# Patient Record
Sex: MALE | Race: WHITE | Employment: OTHER | URBAN - METROPOLITAN AREA
[De-identification: names, ages, dates, MRNs, and addresses within clinical notes are randomized per-mention and may not be internally consistent; named-entity substitution may affect disease eponyms.]

---

## 2020-01-01 ENCOUNTER — OFFICE VISIT (OUTPATIENT)
Dept: FAMILY MEDICINE CLINIC | Facility: CLINIC | Age: 65
End: 2020-01-01
Payer: MEDICARE

## 2020-01-01 ENCOUNTER — OFFICE VISIT (OUTPATIENT)
Dept: SLEEP CENTER | Age: 65
End: 2020-01-01
Attending: Other
Payer: MEDICARE

## 2020-01-01 ENCOUNTER — TELEPHONE (OUTPATIENT)
Dept: FAMILY MEDICINE CLINIC | Facility: CLINIC | Age: 65
End: 2020-01-01

## 2020-01-01 ENCOUNTER — OFFICE VISIT (OUTPATIENT)
Dept: SURGERY | Facility: CLINIC | Age: 65
End: 2020-01-01
Payer: MEDICARE

## 2020-01-01 ENCOUNTER — ORDER TRANSCRIPTION (OUTPATIENT)
Dept: SLEEP CENTER | Age: 65
End: 2020-01-01

## 2020-01-01 ENCOUNTER — LAB ENCOUNTER (OUTPATIENT)
Dept: LAB | Age: 65
End: 2020-01-01
Attending: Other
Payer: MEDICARE

## 2020-01-01 VITALS
HEIGHT: 66.73 IN | DIASTOLIC BLOOD PRESSURE: 60 MMHG | SYSTOLIC BLOOD PRESSURE: 124 MMHG | TEMPERATURE: 97 F | OXYGEN SATURATION: 97 % | WEIGHT: 214 LBS | BODY MASS INDEX: 33.99 KG/M2 | RESPIRATION RATE: 16 BRPM | HEART RATE: 59 BPM

## 2020-01-01 VITALS
HEIGHT: 67.72 IN | OXYGEN SATURATION: 97 % | DIASTOLIC BLOOD PRESSURE: 60 MMHG | SYSTOLIC BLOOD PRESSURE: 122 MMHG | BODY MASS INDEX: 32.91 KG/M2 | HEART RATE: 76 BPM | WEIGHT: 214.63 LBS | TEMPERATURE: 98 F

## 2020-01-01 VITALS — TEMPERATURE: 97 F | DIASTOLIC BLOOD PRESSURE: 76 MMHG | SYSTOLIC BLOOD PRESSURE: 125 MMHG | HEART RATE: 65 BPM

## 2020-01-01 DIAGNOSIS — F90.0 ATTENTION DEFICIT HYPERACTIVITY DISORDER (ADHD), PREDOMINANTLY INATTENTIVE TYPE: ICD-10-CM

## 2020-01-01 DIAGNOSIS — J30.89 PERENNIAL ALLERGIC RHINITIS: ICD-10-CM

## 2020-01-01 DIAGNOSIS — G47.00 INSOMNIA, UNSPECIFIED TYPE: ICD-10-CM

## 2020-01-01 DIAGNOSIS — Z11.59 SCREENING FOR VIRAL DISEASE: ICD-10-CM

## 2020-01-01 DIAGNOSIS — I73.9 PAD (PERIPHERAL ARTERY DISEASE) (HCC): ICD-10-CM

## 2020-01-01 DIAGNOSIS — G47.33 OBSTRUCTIVE SLEEP APNEA: Primary | ICD-10-CM

## 2020-01-01 DIAGNOSIS — Z79.899 HIGH RISK MEDICATION USE: ICD-10-CM

## 2020-01-01 DIAGNOSIS — Z01.818 PREOP EXAMINATION: Primary | ICD-10-CM

## 2020-01-01 DIAGNOSIS — G47.33 OBSTRUCTIVE SLEEP APNEA: ICD-10-CM

## 2020-01-01 DIAGNOSIS — R68.89 ABNORMAL ANKLE BRACHIAL INDEX (ABI): ICD-10-CM

## 2020-01-01 DIAGNOSIS — Z23 FLU VACCINE NEED: ICD-10-CM

## 2020-01-01 DIAGNOSIS — R06.83 SNORING: ICD-10-CM

## 2020-01-01 DIAGNOSIS — Z01.818 PREOP EXAMINATION: ICD-10-CM

## 2020-01-01 DIAGNOSIS — F13.20 HYPNOTIC DEPENDENCE WITH CURRENT USE (HCC): ICD-10-CM

## 2020-01-01 DIAGNOSIS — R06.83 SNORING: Primary | ICD-10-CM

## 2020-01-01 DIAGNOSIS — M15.9 PRIMARY OSTEOARTHRITIS INVOLVING MULTIPLE JOINTS: ICD-10-CM

## 2020-01-01 DIAGNOSIS — Z51.81 THERAPEUTIC DRUG MONITORING: Primary | ICD-10-CM

## 2020-01-01 DIAGNOSIS — Z85.46 HISTORY OF PROSTATE CANCER: Primary | ICD-10-CM

## 2020-01-01 PROCEDURE — 99213 OFFICE O/P EST LOW 20 MIN: CPT | Performed by: UROLOGY

## 2020-01-01 PROCEDURE — 3074F SYST BP LT 130 MM HG: CPT | Performed by: FAMILY MEDICINE

## 2020-01-01 PROCEDURE — 3078F DIAST BP <80 MM HG: CPT | Performed by: FAMILY MEDICINE

## 2020-01-01 PROCEDURE — 3078F DIAST BP <80 MM HG: CPT | Performed by: UROLOGY

## 2020-01-01 PROCEDURE — 3008F BODY MASS INDEX DOCD: CPT | Performed by: FAMILY MEDICINE

## 2020-01-01 PROCEDURE — 90662 IIV NO PRSV INCREASED AG IM: CPT | Performed by: FAMILY MEDICINE

## 2020-01-01 PROCEDURE — 99214 OFFICE O/P EST MOD 30 MIN: CPT | Performed by: FAMILY MEDICINE

## 2020-01-01 PROCEDURE — 95810 POLYSOM 6/> YRS 4/> PARAM: CPT

## 2020-01-01 PROCEDURE — 3074F SYST BP LT 130 MM HG: CPT | Performed by: UROLOGY

## 2020-01-01 PROCEDURE — 95811 POLYSOM 6/>YRS CPAP 4/> PARM: CPT

## 2020-01-01 PROCEDURE — G0008 ADMIN INFLUENZA VIRUS VAC: HCPCS | Performed by: FAMILY MEDICINE

## 2020-01-01 RX ORDER — DEXTROAMPHETAMINE SACCHARATE, AMPHETAMINE ASPARTATE MONOHYDRATE, DEXTROAMPHETAMINE SULFATE AND AMPHETAMINE SULFATE 6.25; 6.25; 6.25; 6.25 MG/1; MG/1; MG/1; MG/1
25 CAPSULE, EXTENDED RELEASE ORAL DAILY
Qty: 30 CAPSULE | Refills: 0 | Status: SHIPPED | OUTPATIENT
Start: 2020-01-01 | End: 2021-01-01 | Stop reason: SDUPTHER

## 2020-01-01 RX ORDER — CETIRIZINE HYDROCHLORIDE 5 MG/1
5 TABLET ORAL DAILY
COMMUNITY

## 2020-01-01 RX ORDER — DEXTROAMPHETAMINE SACCHARATE, AMPHETAMINE ASPARTATE MONOHYDRATE, DEXTROAMPHETAMINE SULFATE AND AMPHETAMINE SULFATE 6.25; 6.25; 6.25; 6.25 MG/1; MG/1; MG/1; MG/1
25 CAPSULE, EXTENDED RELEASE ORAL DAILY
Qty: 30 CAPSULE | Refills: 0 | Status: SHIPPED | OUTPATIENT
Start: 2020-01-01 | End: 2020-01-01

## 2020-01-01 RX ORDER — TADALAFIL 5 MG/1
5 TABLET ORAL
Qty: 30 TABLET | Refills: 11 | Status: SHIPPED | OUTPATIENT
Start: 2020-01-01

## 2020-01-01 RX ORDER — ZOLPIDEM TARTRATE 5 MG/1
5 TABLET ORAL NIGHTLY PRN
Qty: 90 TABLET | Refills: 0 | Status: SHIPPED | OUTPATIENT
Start: 2020-01-01 | End: 2021-01-01

## 2020-01-01 RX ORDER — DEXTROAMPHETAMINE SACCHARATE, AMPHETAMINE ASPARTATE MONOHYDRATE, DEXTROAMPHETAMINE SULFATE AND AMPHETAMINE SULFATE 6.25; 6.25; 6.25; 6.25 MG/1; MG/1; MG/1; MG/1
25 CAPSULE, EXTENDED RELEASE ORAL EVERY MORNING
Qty: 30 CAPSULE | Refills: 0 | Status: SHIPPED | OUTPATIENT
Start: 2020-01-01 | End: 2021-01-01 | Stop reason: SDUPTHER

## 2020-01-01 RX ORDER — CROMOLYN SODIUM 5.2 MG
1 AEROSOL, SPRAY WITH PUMP (ML) NASAL 3 TIMES DAILY
Qty: 3 BOTTLE | Refills: 1 | Status: SHIPPED | OUTPATIENT
Start: 2020-01-01 | End: 2021-01-01

## 2020-01-01 RX ORDER — DEXTROAMPHETAMINE SACCHARATE, AMPHETAMINE ASPARTATE, DEXTROAMPHETAMINE SULFATE AND AMPHETAMINE SULFATE 5; 5; 5; 5 MG/1; MG/1; MG/1; MG/1
TABLET ORAL
Qty: 30 TABLET | Refills: 0 | Status: SHIPPED | OUTPATIENT
Start: 2020-01-01 | End: 2021-01-01

## 2020-01-01 RX ORDER — MELOXICAM 15 MG/1
TABLET ORAL
Qty: 90 TABLET | Refills: 0 | Status: SHIPPED | OUTPATIENT
Start: 2020-01-01 | End: 2021-01-01

## 2020-01-01 RX ORDER — CROMOLYN SODIUM 5.2 MG
1 AEROSOL, SPRAY WITH PUMP (ML) NASAL 3 TIMES DAILY
Qty: 26 ML | Refills: 2 | Status: SHIPPED | OUTPATIENT
Start: 2020-01-01 | End: 2020-01-01

## 2020-02-14 ENCOUNTER — OFFICE VISIT (OUTPATIENT)
Dept: FAMILY MEDICINE CLINIC | Facility: CLINIC | Age: 65
End: 2020-02-14
Payer: MEDICARE

## 2020-02-14 VITALS
TEMPERATURE: 98 F | DIASTOLIC BLOOD PRESSURE: 64 MMHG | WEIGHT: 203 LBS | BODY MASS INDEX: 31.12 KG/M2 | OXYGEN SATURATION: 96 % | HEART RATE: 64 BPM | SYSTOLIC BLOOD PRESSURE: 122 MMHG | HEIGHT: 67.72 IN

## 2020-02-14 DIAGNOSIS — N52.36 ERECTILE DYSFUNCTION FOLLOWING INTERSTITIAL SEED THERAPY: Primary | ICD-10-CM

## 2020-02-14 DIAGNOSIS — M19.041 OSTEOARTHRITIS OF BOTH HANDS, UNSPECIFIED OSTEOARTHRITIS TYPE: ICD-10-CM

## 2020-02-14 DIAGNOSIS — Z87.891 HISTORY OF SMOKING 25-50 PACK YEARS: ICD-10-CM

## 2020-02-14 DIAGNOSIS — F90.0 ATTENTION DEFICIT HYPERACTIVITY DISORDER (ADHD), PREDOMINANTLY INATTENTIVE TYPE: ICD-10-CM

## 2020-02-14 DIAGNOSIS — F13.20 HYPNOTIC DEPENDENCE WITH CURRENT USE (HCC): ICD-10-CM

## 2020-02-14 DIAGNOSIS — J30.89 PERENNIAL ALLERGIC RHINITIS: ICD-10-CM

## 2020-02-14 DIAGNOSIS — M19.042 OSTEOARTHRITIS OF BOTH HANDS, UNSPECIFIED OSTEOARTHRITIS TYPE: ICD-10-CM

## 2020-02-14 DIAGNOSIS — Z85.46 PERSONAL HISTORY OF PROSTATE CANCER: ICD-10-CM

## 2020-02-14 DIAGNOSIS — F41.1 GAD (GENERALIZED ANXIETY DISORDER): ICD-10-CM

## 2020-02-14 PROCEDURE — 99204 OFFICE O/P NEW MOD 45 MIN: CPT | Performed by: FAMILY MEDICINE

## 2020-02-14 RX ORDER — MELOXICAM 15 MG/1
15 TABLET ORAL DAILY
COMMUNITY
End: 2020-03-13

## 2020-02-14 RX ORDER — DEXTROAMPHETAMINE SACCHARATE, AMPHETAMINE ASPARTATE, DEXTROAMPHETAMINE SULFATE AND AMPHETAMINE SULFATE 7.5; 7.5; 7.5; 7.5 MG/1; MG/1; MG/1; MG/1
30 TABLET ORAL DAILY
COMMUNITY
Start: 2020-01-29 | End: 2020-03-13 | Stop reason: SDUPTHER

## 2020-02-14 RX ORDER — ESCITALOPRAM OXALATE 10 MG/1
10 TABLET ORAL DAILY
COMMUNITY
Start: 2020-02-11 | End: 2020-03-13

## 2020-02-14 RX ORDER — SILDENAFIL 100 MG/1
100 TABLET, FILM COATED ORAL
COMMUNITY
End: 2020-02-18

## 2020-02-14 RX ORDER — ZOLPIDEM TARTRATE 12.5 MG/1
12.5 TABLET, FILM COATED, EXTENDED RELEASE ORAL DAILY
COMMUNITY
Start: 2020-02-10 | End: 2020-03-13 | Stop reason: DRUGHIGH

## 2020-02-14 RX ORDER — MONTELUKAST SODIUM 10 MG/1
10 TABLET ORAL DAILY
COMMUNITY
Start: 2020-02-09 | End: 2020-03-13

## 2020-02-14 NOTE — PROGRESS NOTES
Alisa Sullivan is a 75year old male. HPI:       New patient. Erectile dysfunction following interstitial seed therapy   Personal history of prostate cancer  Has been using Viagra 100 mg as needed with fair improvement.   Patient would like to se Dysfunction. • Meloxicam 15 MG Oral Tab Take 15 mg by mouth daily. Take one tab daily as needed with food.         Past Medical History:   Diagnosis Date   • Attention deficit hyperactivity disorder (ADHD), predominantly inattentive type 2/14/2020   • E thyromegaly, no masses  LUNGS: CTA A/P no wheezes/ronchi/rales/crackles  CARDIO: RRR, +S1/S2, no mm/S3/S4  VASCULAR:  no edema  GI: normal bowel sounds, NT/ND, no pulsations, no r/r/g, no masses, no HSM  EXTREMITIES: no cyanosis or clubbing  NEURO: Alert a Singulair 10 mg daily with fairly good control of allergy symptoms. 7. Osteoarthritis of both hands, unspecified osteoarthritis type  Patient taking meloxicam 15 mg daily with fairly good relief.     8. GEMA (generalized anxiety disorder)  Patient taking

## 2020-02-18 ENCOUNTER — TELEPHONE (OUTPATIENT)
Dept: FAMILY MEDICINE CLINIC | Facility: CLINIC | Age: 65
End: 2020-02-18

## 2020-02-18 RX ORDER — TADALAFIL 20 MG/1
20 TABLET ORAL
Qty: 8 TABLET | Refills: 2 | Status: SHIPPED | OUTPATIENT
Start: 2020-02-18 | End: 2020-03-13

## 2020-02-18 RX ORDER — SILDENAFIL 100 MG/1
100 TABLET, FILM COATED ORAL
Qty: 8 TABLET | Refills: 5 | Status: SHIPPED | OUTPATIENT
Start: 2020-02-18 | End: 2020-02-18

## 2020-02-27 ENCOUNTER — TELEPHONE (OUTPATIENT)
Dept: FAMILY MEDICINE CLINIC | Facility: CLINIC | Age: 65
End: 2020-02-27

## 2020-02-27 NOTE — TELEPHONE ENCOUNTER
Patient signed HIPAA medical records authorization form for the Facility identified below to disclose patient health information to [address] [place] [address] Group:     Sarah Velasquez M.D.   Urologist in [facility]  Address: [address] [facility] , [place],

## 2020-03-02 ENCOUNTER — OFFICE VISIT (OUTPATIENT)
Dept: SURGERY | Facility: CLINIC | Age: 65
End: 2020-03-02
Payer: MEDICARE

## 2020-03-02 VITALS — HEART RATE: 73 BPM | SYSTOLIC BLOOD PRESSURE: 130 MMHG | DIASTOLIC BLOOD PRESSURE: 72 MMHG

## 2020-03-02 DIAGNOSIS — Z85.46 HISTORY OF PROSTATE CANCER: ICD-10-CM

## 2020-03-02 DIAGNOSIS — R82.90 URINE FINDING: Primary | ICD-10-CM

## 2020-03-02 DIAGNOSIS — N52.9 ERECTILE DYSFUNCTION, UNSPECIFIED ERECTILE DYSFUNCTION TYPE: ICD-10-CM

## 2020-03-02 DIAGNOSIS — R39.198 SLOW URINARY STREAM: ICD-10-CM

## 2020-03-02 LAB
APPEARANCE: CLEAR
MULTISTIX LOT#: ABNORMAL NUMERIC
PH, URINE: 8.5 (ref 4.5–8)
SPECIFIC GRAVITY: 1.02 (ref 1–1.03)
URINE-COLOR: YELLOW
UROBILINOGEN,SEMI-QN: 0.2 MG/DL (ref 0–1.9)

## 2020-03-02 PROCEDURE — 99203 OFFICE O/P NEW LOW 30 MIN: CPT | Performed by: UROLOGY

## 2020-03-02 PROCEDURE — 81003 URINALYSIS AUTO W/O SCOPE: CPT | Performed by: UROLOGY

## 2020-03-02 RX ORDER — TADALAFIL 5 MG/1
5 TABLET ORAL
Qty: 30 TABLET | Refills: 11 | Status: SHIPPED | OUTPATIENT
Start: 2020-03-02 | End: 2021-01-01

## 2020-03-02 NOTE — PROGRESS NOTES
Rooming Clinician:     Marisel Fofana is a 75year old male. Patient presents with:  Erectile Dysfuntion: s/p seed implant 2007.   Looking for help with his erections  Erectile Dysfunction:  Frequency of erections: Abnormal  Quality of erections: 5 (10 is Take one tab by mouth at bedtime. • amphetamine-dextroamphetamine 30 MG Oral Tab Take 30 mg by mouth daily. One tab by mouth daily     • Meloxicam 15 MG Oral Tab Take 15 mg by mouth daily. Take one tab daily as needed with food.          Penicillins grossly normal  EXTREMITIES: no cyanosis, clubbing or edema    Bladder scan residual is 16 cc    LAB:         X-RAY[de-identified]         ASSESSMENT:     Erectile dysfunction  History of prostate cancer  Slow urinary stream    PLAN:     PSA, CMP, a.m. testosterone  Pa

## 2020-03-02 NOTE — PATIENT INSTRUCTIONS
On behalf of myself and care team, I would like to thank you for entrusting us with your care today. It is our goal to provide you and your family with excellent care.   Please note that you may receive a survey in the mail; any feedback you have for this since they help most men. If they don’t help you, your doctor can suggest other kinds of treatment. You and your partner may also want to discuss which options would work best in your relationship.  Treatment may include addressing the cause of health probl drugs, supplements, and herbs. · Ask your doctor about whether you can drink alcohol while taking ED medication.   Possible side effects of oral ED medicines  · Headache  · Facial flushing  · Runny or stuffy nose  · Indigestion  · Distortion of your color

## 2020-03-13 ENCOUNTER — OFFICE VISIT (OUTPATIENT)
Dept: FAMILY MEDICINE CLINIC | Facility: CLINIC | Age: 65
End: 2020-03-13
Payer: MEDICARE

## 2020-03-13 VITALS
SYSTOLIC BLOOD PRESSURE: 115 MMHG | HEART RATE: 60 BPM | WEIGHT: 296 LBS | OXYGEN SATURATION: 98 % | HEIGHT: 67.72 IN | DIASTOLIC BLOOD PRESSURE: 60 MMHG | BODY MASS INDEX: 45.38 KG/M2 | TEMPERATURE: 98 F

## 2020-03-13 DIAGNOSIS — N52.36 ERECTILE DYSFUNCTION FOLLOWING INTERSTITIAL SEED THERAPY: ICD-10-CM

## 2020-03-13 DIAGNOSIS — M15.9 PRIMARY OSTEOARTHRITIS INVOLVING MULTIPLE JOINTS: ICD-10-CM

## 2020-03-13 DIAGNOSIS — Z87.891 HISTORY OF SMOKING 25-50 PACK YEARS: ICD-10-CM

## 2020-03-13 DIAGNOSIS — F13.20 HYPNOTIC DEPENDENCE WITH CURRENT USE (HCC): ICD-10-CM

## 2020-03-13 DIAGNOSIS — M19.041 PRIMARY OSTEOARTHRITIS OF BOTH HANDS: ICD-10-CM

## 2020-03-13 DIAGNOSIS — M19.042 PRIMARY OSTEOARTHRITIS OF BOTH HANDS: ICD-10-CM

## 2020-03-13 DIAGNOSIS — J30.89 PERENNIAL ALLERGIC RHINITIS: ICD-10-CM

## 2020-03-13 DIAGNOSIS — E66.01 MORBID OBESITY WITH BMI OF 45.0-49.9, ADULT (HCC): ICD-10-CM

## 2020-03-13 DIAGNOSIS — Z23 NEED FOR VACCINATION: ICD-10-CM

## 2020-03-13 DIAGNOSIS — I70.0 ATHEROSCLEROSIS OF ABDOMINAL AORTA (HCC): ICD-10-CM

## 2020-03-13 DIAGNOSIS — F90.0 ATTENTION DEFICIT HYPERACTIVITY DISORDER (ADHD), PREDOMINANTLY INATTENTIVE TYPE: ICD-10-CM

## 2020-03-13 DIAGNOSIS — F41.1 GAD (GENERALIZED ANXIETY DISORDER): ICD-10-CM

## 2020-03-13 DIAGNOSIS — Z85.46 PERSONAL HISTORY OF PROSTATE CANCER: ICD-10-CM

## 2020-03-13 DIAGNOSIS — Z00.00 MEDICARE ANNUAL WELLNESS VISIT, INITIAL: Primary | ICD-10-CM

## 2020-03-13 DIAGNOSIS — G47.00 INSOMNIA, UNSPECIFIED TYPE: ICD-10-CM

## 2020-03-13 PROCEDURE — G0402 INITIAL PREVENTIVE EXAM: HCPCS | Performed by: FAMILY MEDICINE

## 2020-03-13 PROCEDURE — 90670 PCV13 VACCINE IM: CPT | Performed by: FAMILY MEDICINE

## 2020-03-13 PROCEDURE — G0009 ADMIN PNEUMOCOCCAL VACCINE: HCPCS | Performed by: FAMILY MEDICINE

## 2020-03-13 PROCEDURE — 96160 PT-FOCUSED HLTH RISK ASSMT: CPT | Performed by: FAMILY MEDICINE

## 2020-03-13 PROCEDURE — 99397 PER PM REEVAL EST PAT 65+ YR: CPT | Performed by: FAMILY MEDICINE

## 2020-03-13 RX ORDER — DEXTROAMPHETAMINE SACCHARATE, AMPHETAMINE ASPARTATE, DEXTROAMPHETAMINE SULFATE AND AMPHETAMINE SULFATE 7.5; 7.5; 7.5; 7.5 MG/1; MG/1; MG/1; MG/1
30 TABLET ORAL DAILY
Qty: 30 TABLET | Refills: 0 | Status: SHIPPED | OUTPATIENT
Start: 2020-05-14 | End: 2020-06-11 | Stop reason: SDUPTHER

## 2020-03-13 RX ORDER — MELOXICAM 15 MG/1
15 TABLET ORAL DAILY PRN
Qty: 90 TABLET | Refills: 1 | Status: SHIPPED | OUTPATIENT
Start: 2020-03-13 | End: 2020-09-04

## 2020-03-13 RX ORDER — DEXTROAMPHETAMINE SACCHARATE, AMPHETAMINE ASPARTATE, DEXTROAMPHETAMINE SULFATE AND AMPHETAMINE SULFATE 7.5; 7.5; 7.5; 7.5 MG/1; MG/1; MG/1; MG/1
30 TABLET ORAL DAILY
Qty: 30 TABLET | Refills: 0 | Status: SHIPPED | OUTPATIENT
Start: 2020-03-13 | End: 2020-04-12 | Stop reason: WASHOUT

## 2020-03-13 RX ORDER — DEXTROAMPHETAMINE SACCHARATE, AMPHETAMINE ASPARTATE, DEXTROAMPHETAMINE SULFATE AND AMPHETAMINE SULFATE 7.5; 7.5; 7.5; 7.5 MG/1; MG/1; MG/1; MG/1
30 TABLET ORAL DAILY
Qty: 30 TABLET | Refills: 0 | Status: SHIPPED | OUTPATIENT
Start: 2020-04-13 | End: 2020-05-13 | Stop reason: WASHOUT

## 2020-03-13 RX ORDER — MONTELUKAST SODIUM 10 MG/1
10 TABLET ORAL DAILY PRN
Qty: 90 TABLET | Refills: 1 | Status: SHIPPED | OUTPATIENT
Start: 2020-03-13 | End: 2020-09-04

## 2020-03-13 RX ORDER — ESCITALOPRAM OXALATE 10 MG/1
10 TABLET ORAL DAILY
Qty: 90 TABLET | Refills: 3 | Status: SHIPPED | OUTPATIENT
Start: 2020-03-13 | End: 2021-01-01

## 2020-03-13 RX ORDER — ZOLPIDEM TARTRATE 6.25 MG/1
6.25 TABLET, FILM COATED, EXTENDED RELEASE ORAL NIGHTLY PRN
Qty: 90 TABLET | Refills: 0 | Status: SHIPPED | OUTPATIENT
Start: 2020-03-13 | End: 2020-06-11 | Stop reason: DRUGHIGH

## 2020-03-13 NOTE — PATIENT INSTRUCTIONS
Recommended Websites for Advanced Directives    [email or web address].no. org/publications/[email or web address]/personal_[email or web address]. pdf  An information packet, including necessary form from the [employer] [address] website. http://www. [place]state. [place]/public/books/adv Goods  Chips

## 2020-03-19 PROBLEM — M15.9 PRIMARY OSTEOARTHRITIS INVOLVING MULTIPLE JOINTS: Status: ACTIVE | Noted: 2020-03-19

## 2020-03-19 PROBLEM — G47.00 INSOMNIA: Status: ACTIVE | Noted: 2020-03-19

## 2020-04-23 ENCOUNTER — MED REC SCAN ONLY (OUTPATIENT)
Dept: FAMILY MEDICINE CLINIC | Facility: CLINIC | Age: 65
End: 2020-04-23

## 2020-05-11 DIAGNOSIS — F41.1 GAD (GENERALIZED ANXIETY DISORDER): ICD-10-CM

## 2020-05-11 RX ORDER — ESCITALOPRAM OXALATE 10 MG/1
10 TABLET ORAL DAILY
Qty: 90 TABLET | Refills: 3 | OUTPATIENT
Start: 2020-05-11

## 2020-05-11 NOTE — TELEPHONE ENCOUNTER
Pt requesting refill of Escitalopram 10 mg     Last Time Medication was Filled:  03/13/2020  Qty#90 with 3 refills    Last Office Visit with Provider: 03/13/2020 Return in about 3 months (around 6/13/2020) for Sleep disorder. Sooner if needed. Sophie Kelly sc

## 2020-06-09 ENCOUNTER — OFFICE VISIT (OUTPATIENT)
Dept: SURGERY | Facility: CLINIC | Age: 65
End: 2020-06-09
Payer: MEDICARE

## 2020-06-09 VITALS — SYSTOLIC BLOOD PRESSURE: 144 MMHG | HEART RATE: 84 BPM | DIASTOLIC BLOOD PRESSURE: 70 MMHG

## 2020-06-09 DIAGNOSIS — R82.90 URINE FINDING: Primary | ICD-10-CM

## 2020-06-09 DIAGNOSIS — R39.198 SLOW URINARY STREAM: ICD-10-CM

## 2020-06-09 DIAGNOSIS — N52.9 ERECTILE DYSFUNCTION, UNSPECIFIED ERECTILE DYSFUNCTION TYPE: ICD-10-CM

## 2020-06-09 DIAGNOSIS — Z85.46 HISTORY OF PROSTATE CANCER: ICD-10-CM

## 2020-06-09 PROCEDURE — 51741 ELECTRO-UROFLOWMETRY FIRST: CPT | Performed by: UROLOGY

## 2020-06-09 PROCEDURE — 81003 URINALYSIS AUTO W/O SCOPE: CPT | Performed by: UROLOGY

## 2020-06-09 PROCEDURE — 99213 OFFICE O/P EST LOW 20 MIN: CPT | Performed by: UROLOGY

## 2020-06-09 NOTE — PROGRESS NOTES
Rooming Clinician:     Seble Fierro is a 75year old male. Patient presents with: Follow - Up  Urinary Symptoms: dribbling improving        HPI:     Patient returns for follow-up visit.   He has a history of prostate cancer treated with brachii therapy 8/12/2020] amphetamine-dextroamphetamine (ADDERALL) 30 MG Oral Tab Take 1 tablet (30 mg total) by mouth daily.  30 tablet 0       Penicillins             HIVES   Past Medical History:   Diagnosis Date   • Attention deficit hyperactivity disorder (ADHD), pre edema    LAB:         X-RAY[de-identified]         ASSESSMENT:     History of prostate cancer  Slow urinary stream/improved  Erectile dysfunction/improved    PLAN:     Continue daily Cialis 5 mg  PSA in a.m. testosterone.   If PSA is within an acceptable range, should b

## 2020-06-11 ENCOUNTER — OFFICE VISIT (OUTPATIENT)
Dept: FAMILY MEDICINE CLINIC | Facility: CLINIC | Age: 65
End: 2020-06-11
Payer: MEDICARE

## 2020-06-11 VITALS
WEIGHT: 213.81 LBS | OXYGEN SATURATION: 98 % | TEMPERATURE: 99 F | HEART RATE: 78 BPM | BODY MASS INDEX: 33 KG/M2 | DIASTOLIC BLOOD PRESSURE: 82 MMHG | SYSTOLIC BLOOD PRESSURE: 128 MMHG

## 2020-06-11 DIAGNOSIS — G47.00 INSOMNIA, UNSPECIFIED TYPE: ICD-10-CM

## 2020-06-11 DIAGNOSIS — F13.20 HYPNOTIC DEPENDENCE WITH CURRENT USE (HCC): ICD-10-CM

## 2020-06-11 DIAGNOSIS — F90.0 ATTENTION DEFICIT HYPERACTIVITY DISORDER (ADHD), PREDOMINANTLY INATTENTIVE TYPE: ICD-10-CM

## 2020-06-11 DIAGNOSIS — S39.012A STRAIN OF LUMBAR REGION, INITIAL ENCOUNTER: Primary | ICD-10-CM

## 2020-06-11 PROCEDURE — 99214 OFFICE O/P EST MOD 30 MIN: CPT | Performed by: FAMILY MEDICINE

## 2020-06-11 RX ORDER — DEXTROAMPHETAMINE SACCHARATE, AMPHETAMINE ASPARTATE, DEXTROAMPHETAMINE SULFATE AND AMPHETAMINE SULFATE 7.5; 7.5; 7.5; 7.5 MG/1; MG/1; MG/1; MG/1
30 TABLET ORAL DAILY
Qty: 30 TABLET | Refills: 0 | Status: SHIPPED | OUTPATIENT
Start: 2020-07-12 | End: 2020-08-11 | Stop reason: WASHOUT

## 2020-06-11 RX ORDER — DEXTROAMPHETAMINE SACCHARATE, AMPHETAMINE ASPARTATE, DEXTROAMPHETAMINE SULFATE AND AMPHETAMINE SULFATE 7.5; 7.5; 7.5; 7.5 MG/1; MG/1; MG/1; MG/1
30 TABLET ORAL DAILY
Qty: 30 TABLET | Refills: 0 | Status: SHIPPED | OUTPATIENT
Start: 2020-08-12 | End: 2020-09-11 | Stop reason: WASHOUT

## 2020-06-11 RX ORDER — ZOLPIDEM TARTRATE 5 MG/1
5 TABLET ORAL NIGHTLY PRN
Qty: 90 TABLET | Refills: 0 | Status: SHIPPED | OUTPATIENT
Start: 2020-06-11 | End: 2020-09-04

## 2020-06-11 RX ORDER — DEXTROAMPHETAMINE SACCHARATE, AMPHETAMINE ASPARTATE, DEXTROAMPHETAMINE SULFATE AND AMPHETAMINE SULFATE 7.5; 7.5; 7.5; 7.5 MG/1; MG/1; MG/1; MG/1
30 TABLET ORAL DAILY
Qty: 30 TABLET | Refills: 0 | Status: CANCELLED | OUTPATIENT
Start: 2020-06-11 | End: 2020-07-11

## 2020-06-11 RX ORDER — DEXTROAMPHETAMINE SACCHARATE, AMPHETAMINE ASPARTATE, DEXTROAMPHETAMINE SULFATE AND AMPHETAMINE SULFATE 7.5; 7.5; 7.5; 7.5 MG/1; MG/1; MG/1; MG/1
30 TABLET ORAL DAILY
Qty: 30 TABLET | Refills: 0 | Status: SHIPPED | OUTPATIENT
Start: 2020-06-11 | End: 2020-07-11 | Stop reason: WASHOUT

## 2020-06-11 RX ORDER — ZOLPIDEM TARTRATE 6.25 MG/1
6.25 TABLET, FILM COATED, EXTENDED RELEASE ORAL NIGHTLY PRN
Qty: 90 TABLET | Refills: 0 | Status: CANCELLED | OUTPATIENT
Start: 2020-06-11

## 2020-06-11 NOTE — PROGRESS NOTES
Wes Matute is a 75year old male. HPI:     Insomnia:  Patient has been taking zolpidem CR 6.25 mg for the last 3 months, previously on zolpidem CR 12.5 mg nightly for 12 months or more.   Patient has been doing well on the decreased dose of the zol Tab Take 1 tablet (10 mg total) by mouth daily as needed. 90 tablet 1   • tadalafil 5 MG Oral Tab Take 1 tablet (5 mg total) by mouth daily as needed for Erectile Dysfunction.  30 tablet 11      Past Medical History:   Diagnosis Date   • Attention deficit h 128/82 (BP Location: Left arm, Patient Position: Sitting, Cuff Size: adult)   Pulse 78   Temp 98.8 °F (37.1 °C) (Oral)   Wt 213 lb 12.8 oz (97 kg)   SpO2 98%   BMI 32.78 kg/m²   GENERAL: NAD, pleasant well-appearing obese  male  SKIN: no visible r mg total) by mouth nightly as needed for Sleep. Dispense: 90 tablet; Refill: 0    4. Attention deficit hyperactivity disorder (ADHD), predominantly inattentive type  Stable. Recommend patient take a half a tab or skip a dose on days when able.     - amphe

## 2020-06-11 NOTE — PATIENT INSTRUCTIONS
-Some days try taking half of the Adderall tablet when able. -If back pain is not improving after 7 to 10 days of doing the back stretches/exercises, please call to let Dr. Robbie Martinez know, he may need physical therapy.

## 2020-06-16 DIAGNOSIS — E55.9 VITAMIN D DEFICIENCY: Primary | ICD-10-CM

## 2020-06-16 RX ORDER — ERGOCALCIFEROL (VITAMIN D2) 1250 MCG
50000 CAPSULE ORAL WEEKLY
Qty: 12 CAPSULE | Refills: 0 | Status: SHIPPED | OUTPATIENT
Start: 2020-06-16 | End: 2021-01-01

## 2020-06-24 ENCOUNTER — OFFICE VISIT (OUTPATIENT)
Dept: FAMILY MEDICINE CLINIC | Facility: CLINIC | Age: 65
End: 2020-06-24
Payer: MEDICARE

## 2020-06-24 VITALS
HEART RATE: 77 BPM | WEIGHT: 219 LBS | BODY MASS INDEX: 33.58 KG/M2 | SYSTOLIC BLOOD PRESSURE: 130 MMHG | OXYGEN SATURATION: 97 % | TEMPERATURE: 99 F | DIASTOLIC BLOOD PRESSURE: 64 MMHG | HEIGHT: 67.72 IN

## 2020-06-24 DIAGNOSIS — E78.5 DYSLIPIDEMIA: Primary | ICD-10-CM

## 2020-06-24 DIAGNOSIS — E55.9 VITAMIN D DEFICIENCY: ICD-10-CM

## 2020-06-24 DIAGNOSIS — E53.8 B12 DEFICIENCY: ICD-10-CM

## 2020-06-24 DIAGNOSIS — D64.9 ANEMIA, UNSPECIFIED TYPE: ICD-10-CM

## 2020-06-24 PROCEDURE — 99214 OFFICE O/P EST MOD 30 MIN: CPT | Performed by: FAMILY MEDICINE

## 2020-06-24 NOTE — PROGRESS NOTES
Harolyn Goodell is a 75year old male. HPI:       Anemia:  Recent iron levels in normal range. Patient states he has been told in the past that he has a type of non-worrisome anemia. Dyslipidemia:  Denies family history of heart attack or stroke. both hands 2/14/2020   • Perennial allergic rhinitis 2/14/2020   • Personal history of prostate cancer 2/14/2020   • Severe obesity (BMI >= 40) ([address] 3/13/2020      History reviewed. No pertinent surgical history.    Social History:    Social History    Tob no pulsations, no r/r/g, no masses, no HSM  EXTREMITIES: no cyanosis or clubbing  NEURO: Alert and Oriented x3, CN II-XII grossly intact, no focal weakness  PSYCH: affect normal        DATA:    Component      Latest Ref Rng & Units 6/11/2020   WHITE BLOOD anemia, possible causes of anemia and treatment. 1. Dyslipidemia  Mixed hyperlipidemia with low HDL. Mediterranean diet recommended and discussed.     2. Vitamin D deficiency  Complete full 3-month course of prescription strength vitamin D as prescribed

## 2020-06-24 NOTE — PATIENT INSTRUCTIONS
-Recommend Mediterranean diet.    -Start taking a B12 supplement 500 mcg daily, or a B complex is fine as well.

## 2020-06-25 ENCOUNTER — TELEPHONE (OUTPATIENT)
Dept: SURGERY | Facility: CLINIC | Age: 65
End: 2020-06-25

## 2020-06-25 ENCOUNTER — MED REC SCAN ONLY (OUTPATIENT)
Dept: FAMILY MEDICINE CLINIC | Facility: CLINIC | Age: 65
End: 2020-06-25

## 2020-06-25 NOTE — TELEPHONE ENCOUNTER
----- Message from Tyler Raza MD sent at 6/18/2020 11:25 AM CDT -----  Your recent PSA is undetectable and testosterone level is normal.  Okay to use Cialis as prescribed and follow-up in the office in 6 to 12 months.   Should have both a EVA and PSA

## 2020-07-23 ENCOUNTER — HOSPITAL ENCOUNTER (OUTPATIENT)
Dept: CT IMAGING | Facility: HOSPITAL | Age: 65
Discharge: HOME | End: 2020-07-23
Attending: FAMILY MEDICINE
Payer: MEDICARE

## 2020-07-23 DIAGNOSIS — Z87.891 HISTORY OF SMOKING 25-50 PACK YEARS: ICD-10-CM

## 2020-07-29 DIAGNOSIS — R91.8 PULMONARY NODULES: ICD-10-CM

## 2020-07-29 DIAGNOSIS — Z87.891 HISTORY OF SMOKING 25-50 PACK YEARS: ICD-10-CM

## 2020-07-29 DIAGNOSIS — R91.8 ABNORMAL CT LUNG SCREENING: ICD-10-CM

## 2020-07-29 DIAGNOSIS — R91.8 LUNG NODULES: Primary | ICD-10-CM

## 2020-07-29 DIAGNOSIS — Z87.891 HISTORY OF SMOKING 25-50 PACK YEARS: Primary | ICD-10-CM

## 2020-08-05 LAB
ABSOLUTE BASOPHILS: 59 CELLS/UL (ref 0–200)
ABSOLUTE EOSINOPHILS: 127 CELLS/UL (ref 15–500)
ABSOLUTE LYMPHOCYTES: 1529 CELLS/UL (ref 850–3900)
ABSOLUTE MONOCYTES: 387 CELLS/UL (ref 200–950)
ABSOLUTE NEUTROPHILS: 2798 CELLS/UL (ref 1500–7800)
BASOPHILS: 1.2 %
EOSINOPHILS: 2.6 %
HEMATOCRIT: 40.5 % (ref 38.5–50)
HEMOGLOBIN: 12.7 G/DL (ref 13.2–17.1)
LYMPHOCYTES: 31.2 %
MCH: 20.5 PG (ref 27–33)
MCHC: 31.4 G/DL (ref 32–36)
MCV: 65.3 FL (ref 80–100)
MONOCYTES: 7.9 %
MPV: 11.1 FL (ref 7.5–12.5)
NEUTROPHILS: 57.1 %
PLATELET COUNT: 245 THOUSAND/UL (ref 140–400)
RDW: 17.3 % (ref 11–15)
RED BLOOD CELL COUNT: 6.2 MILLION/UL (ref 4.2–5.8)
VITAMIN B12: 344 PG/ML (ref 200–1100)
VITAMIN D, 25-OH, TOTAL: 43 NG/ML (ref 30–100)
WHITE BLOOD CELL COUNT: 4.9 THOUSAND/UL (ref 3.8–10.8)

## 2020-08-10 DIAGNOSIS — D64.9 ANEMIA, UNSPECIFIED TYPE: Primary | ICD-10-CM

## 2020-09-04 DIAGNOSIS — J30.89 PERENNIAL ALLERGIC RHINITIS: ICD-10-CM

## 2020-09-04 DIAGNOSIS — G47.00 INSOMNIA, UNSPECIFIED TYPE: ICD-10-CM

## 2020-09-04 DIAGNOSIS — E55.9 VITAMIN D DEFICIENCY: ICD-10-CM

## 2020-09-04 DIAGNOSIS — M15.9 PRIMARY OSTEOARTHRITIS INVOLVING MULTIPLE JOINTS: ICD-10-CM

## 2020-09-04 DIAGNOSIS — F13.20 HYPNOTIC DEPENDENCE WITH CURRENT USE (HCC): ICD-10-CM

## 2020-09-04 RX ORDER — ERGOCALCIFEROL 1.25 MG/1
CAPSULE ORAL
Qty: 12 CAPSULE | Refills: 0 | OUTPATIENT
Start: 2020-09-04

## 2020-09-04 RX ORDER — MONTELUKAST SODIUM 10 MG/1
10 TABLET ORAL DAILY PRN
Qty: 90 TABLET | Refills: 1 | Status: SHIPPED | OUTPATIENT
Start: 2020-09-04 | End: 2021-01-01

## 2020-09-04 RX ORDER — MELOXICAM 15 MG/1
TABLET ORAL
Qty: 90 TABLET | Refills: 0 | Status: SHIPPED | OUTPATIENT
Start: 2020-09-04 | End: 2020-01-01

## 2020-09-04 RX ORDER — ZOLPIDEM TARTRATE 5 MG/1
5 TABLET ORAL NIGHTLY PRN
Qty: 90 TABLET | Refills: 0 | Status: SHIPPED | OUTPATIENT
Start: 2020-09-04 | End: 2020-01-01

## 2020-09-04 NOTE — TELEPHONE ENCOUNTER
Requested Prescriptions     Pending Prescriptions Disp Refills   • ZOLPIDEM 5 MG Oral Tab [Pharmacy Med Name: Zolpidem Tartrate 5 Mg Tab Nort] 90 tablet 0     Sig: Take 1 tablet (5 mg total) by mouth nightly as needed for Sleep.    • MELOXICAM 15 MG Oral Ta

## 2020-09-04 NOTE — TELEPHONE ENCOUNTER
Requested Prescriptions     Pending Prescriptions Disp Refills   • MONTELUKAST SODIUM 10 MG Oral Tab [Pharmacy Med Name: Montelukast Sodium 10 Mg Tab Auro] 90 tablet 0     Sig: Take 1 tablet (10 mg total) by mouth daily as needed.      Refused Prescriptions

## 2020-09-23 PROBLEM — R06.83 SNORING: Status: ACTIVE | Noted: 2020-01-01

## 2020-09-23 PROBLEM — I73.9 PAD (PERIPHERAL ARTERY DISEASE) (HCC): Status: ACTIVE | Noted: 2020-01-01

## 2020-09-23 PROBLEM — R68.89 ABNORMAL ANKLE BRACHIAL INDEX (ABI): Status: ACTIVE | Noted: 2020-01-01

## 2020-09-23 NOTE — PROGRESS NOTES
Jc Deal is a 75year old male. HPI:       Snoring:  Falls asleep okay. Difficulty staying asleep. Feels he does not sleep \"that long\". Frequently feels \"tired\". Patient had abnormal EVAN at outside facility.       Attention deficit hype • Attention deficit hyperactivity disorder (ADHD), predominantly inattentive type 2/14/2020   • Erectile dysfunction following interstitial seed therapy 2/14/2020   • GEMA (generalized anxiety disorder) 2/14/2020   • History of smoking 25-50 pack years 2/ (97.3 kg)   SpO2 97%   BMI 32.90 kg/m²   GENERAL: NAD, pleasant obese  male  SKIN: no visible rashes  HEAD: NCAT  NECK: supple, no adenopathy, no thyromegaly, no masses  LUNGS: CTA A/P no wheezes/ronchi/rales/crackles  CARDIO: RRR, +S1/S2, no mm/S Previously on 30 mg. Follow-up in 4 weeks. - Amphetamine-Dextroamphet ER (ADDERALL XR) 25 MG Oral Capsule SR 24 Hr; Take 1 capsule (25 mg total) by mouth every morning. Dispense: 30 capsule; Refill: 0    6.  Perennial allergic rhinitis  Symptoms not co

## 2020-10-21 NOTE — PROGRESS NOTES
Rita Acuna is a 75year old male. HPI:       ADHD:  Predominantly inattentive type. Doing well with restart of Adderall XR 25 mg every morning, patient restarted this since he went back to work.   Patient requesting a splittable tablet to take on MELOXICAM 15 MG Oral Tab Take 1 tablet (15 mg total) by mouth daily as needed for Pain with food  90 tablet 0   • escitalopram 10 MG Oral Tab Take 1 tablet (10 mg total) by mouth daily.  One tab by mouth daily 90 tablet 3   • tadalafil 5 MG Oral Tab Take 1 05/21/2013      FLU VAC High Dose 65 YRS & Older PRSV Free (72057)                          12/01/2019 09/23/2020      Pneumococcal (Prevnar 13)                          03/13/2020      EXAM:   /60   Pulse 59   Temp 97.1 °F (36.2 °C) (Tempor medication holiday when able. Trial of regular release Adderall tablet to take 1/2-1 tab daily as needed on days when he is not working. Follow-up in 3 months. - Amphetamine-Dextroamphet ER (ADDERALL XR) 25 MG Oral Capsule SR 24 Hr;  Take 1 capsule (25

## 2020-10-22 NOTE — TELEPHONE ENCOUNTER
Called Dilia back and relayed message.  They will call Dr. Villaseñor Round office Left ventricular dysfunction has not progressively gotten worse will continue with current medication plan to have a repeat follow-up with Dr. Valles in 6-month

## 2020-10-22 NOTE — TELEPHONE ENCOUNTER
Jessica Allen from [facility] is calling regarding a PT/INR that was ordered for Kianna Ax he is there wanting to have it done but there is no order in the system Jessica Allen is just wondering if Kianna Moran should have it per Dr Mikki Mena, please call Jessica Allen at [phone]

## 2020-10-22 NOTE — TELEPHONE ENCOUNTER
We do not have an indication to have patient check PT/INR. However, recommend patient call Dr. Roshan Parker office, pulmonologist, Dr. Kori Ryder may need for him to have this checked before a procedure he plans on doing.

## 2020-10-25 PROBLEM — Z79.899 HIGH RISK MEDICATION USE: Status: ACTIVE | Noted: 2020-01-01

## 2020-10-25 PROBLEM — Z51.81 THERAPEUTIC DRUG MONITORING: Status: ACTIVE | Noted: 2020-01-01

## 2020-11-03 NOTE — PROCEDURES
[address] [address],[facility]       Accredited by the [facility] ([facility]ASM)    PATIENT'S NAME:        Anjum MELENDEZ  ATTENDING PHYSICIAN:   Cory Hill DO  REFERRING PHYSICIAN:   GISELLE Barron lateral positions. REM supine sleep was not recorded. RESPIRATORY MONITORING:  Moderate to loud snoring was recorded. There was a total of 74 respiratory events, 71 were obstructive hypopneas, 3 obstructive apneas.   Overall Apnea-hypopnea index wa

## 2020-11-16 NOTE — TELEPHONE ENCOUNTER
----- Message from Cheyanne Fonseca DO sent at 11/15/2020  1:45 PM CST -----  Please call patient and verify that the sleep specialist's office has reached out to him to schedule appointment for follow-up.

## 2020-11-16 NOTE — TELEPHONE ENCOUNTER
LMOM to return call to the office. Provided pt office phone [phone] along with office hours.     Detailed message left asking patient if sleep specialist has contacted him

## 2020-11-20 NOTE — TELEPHONE ENCOUNTER
3rd attempt to reach patient. Left detailed message for him with Dr. Jake Lopez message.  Advised to call us back if he has not made appointment with sleep specialist.

## 2020-11-30 NOTE — TELEPHONE ENCOUNTER
Pt requesting refill of ZOLPIDEM 5 MG Oral Tab    Last Time Medication was Filled:  9/4/20    Last Office Visit with Provider: 10/21/20    Appt scheduled on 1/21/21

## 2020-12-01 NOTE — TELEPHONE ENCOUNTER
MELOXICAM 15 MG TABLET 09/04/2020 09/04/2020  90 tablet  90     LOV 10/21/20  Has future appointment 1/21/21

## 2020-12-29 NOTE — PROCEDURES
[address] [address],[facility]       Accredited by the [facility] ([facility]ASM)    PATIENT'S NAME:        EMELINA Encarnacion  ATTENDING PHYSICIAN:   Yeny Stone DO  REFERRING PHYSICIAN:   Ashlyn Lindsay DO  PATIENT AC in the supine and lateral positions. REM supine sleep was recorded. The patient was started on CPAP with the use of an XL Respironics Wisp interface. Initial CPAP pressure was 6 and final CPAP pressure was 11.   At a final setting of 11, the apnea-hypopn

## 2020-12-29 NOTE — PROGRESS NOTES
Rooming Clinician:     Chester Barrow is a 77year old male. Patient presents with: Follow - Up        HPI:     Patient returns for follow-up examination.   He has a history of prostate cancer treated with brachytherapy in 2011 and seems to be doing well (ADDERALL XR) 25 MG Oral Capsule SR 24 Hr Take 1 capsule (25 mg total) by mouth daily.  30 capsule 0       Penicillins             HIVES   Past Medical History:   Diagnosis Date   • Attention deficit hyperactivity disorder (ADHD), predominantly inattentive tenderness  NEURO: grossly normal  EXTREMITIES: no cyanosis, clubbing or edema    LAB:     Lab Results   Component Value Date    WBC 4.9 08/04/2020    HGB 12.7 (L) 08/04/2020    HCT 40.5 08/04/2020     08/04/2020    CREATSERUM 0.85 06/11/2020    BUN

## 2021-01-01 ENCOUNTER — TELEPHONE (OUTPATIENT)
Dept: SURGERY | Facility: CLINIC | Age: 66
End: 2021-01-01

## 2021-01-01 ENCOUNTER — OFFICE VISIT (OUTPATIENT)
Dept: FAMILY MEDICINE CLINIC | Facility: CLINIC | Age: 66
End: 2021-01-01
Payer: MEDICARE

## 2021-01-01 ENCOUNTER — OFFICE VISIT (OUTPATIENT)
Dept: SURGERY | Facility: CLINIC | Age: 66
End: 2021-01-01
Payer: MEDICARE

## 2021-01-01 ENCOUNTER — HOSPITAL ENCOUNTER (OUTPATIENT)
Dept: CT IMAGING | Facility: HOSPITAL | Age: 66
Discharge: HOME | End: 2021-01-01
Attending: FAMILY MEDICINE
Payer: MEDICARE

## 2021-01-01 VITALS
TEMPERATURE: 98 F | SYSTOLIC BLOOD PRESSURE: 120 MMHG | WEIGHT: 208 LBS | HEIGHT: 66 IN | DIASTOLIC BLOOD PRESSURE: 60 MMHG | OXYGEN SATURATION: 98 % | HEART RATE: 72 BPM | BODY MASS INDEX: 33.43 KG/M2

## 2021-01-01 VITALS — HEART RATE: 64 BPM | SYSTOLIC BLOOD PRESSURE: 133 MMHG | TEMPERATURE: 97 F | DIASTOLIC BLOOD PRESSURE: 69 MMHG

## 2021-01-01 VITALS
TEMPERATURE: 97 F | DIASTOLIC BLOOD PRESSURE: 74 MMHG | OXYGEN SATURATION: 97 % | BODY MASS INDEX: 33.85 KG/M2 | HEART RATE: 69 BPM | SYSTOLIC BLOOD PRESSURE: 136 MMHG | HEIGHT: 66 IN | WEIGHT: 210.63 LBS

## 2021-01-01 DIAGNOSIS — R91.8 ABNORMAL CT LUNG SCREENING: ICD-10-CM

## 2021-01-01 DIAGNOSIS — I73.9 PAD (PERIPHERAL ARTERY DISEASE) (HCC): ICD-10-CM

## 2021-01-01 DIAGNOSIS — J30.89 PERENNIAL ALLERGIC RHINITIS: ICD-10-CM

## 2021-01-01 DIAGNOSIS — Z85.46 HISTORY OF PROSTATE CANCER: ICD-10-CM

## 2021-01-01 DIAGNOSIS — Z85.46 PERSONAL HISTORY OF PROSTATE CANCER: ICD-10-CM

## 2021-01-01 DIAGNOSIS — Z79.899 HIGH RISK MEDICATION USE: ICD-10-CM

## 2021-01-01 DIAGNOSIS — G47.00 INSOMNIA, UNSPECIFIED TYPE: ICD-10-CM

## 2021-01-01 DIAGNOSIS — M15.9 PRIMARY OSTEOARTHRITIS INVOLVING MULTIPLE JOINTS: ICD-10-CM

## 2021-01-01 DIAGNOSIS — M19.041 PRIMARY OSTEOARTHRITIS OF BOTH HANDS: ICD-10-CM

## 2021-01-01 DIAGNOSIS — R91.8 PULMONARY NODULES: ICD-10-CM

## 2021-01-01 DIAGNOSIS — F41.1 GAD (GENERALIZED ANXIETY DISORDER): ICD-10-CM

## 2021-01-01 DIAGNOSIS — E55.9 VITAMIN D DEFICIENCY: ICD-10-CM

## 2021-01-01 DIAGNOSIS — N52.36 ERECTILE DYSFUNCTION FOLLOWING INTERSTITIAL SEED THERAPY: ICD-10-CM

## 2021-01-01 DIAGNOSIS — F13.20 HYPNOTIC DEPENDENCE WITH CURRENT USE (HCC): ICD-10-CM

## 2021-01-01 DIAGNOSIS — Z87.891 HISTORY OF SMOKING 25-50 PACK YEARS: ICD-10-CM

## 2021-01-01 DIAGNOSIS — F90.0 ATTENTION DEFICIT HYPERACTIVITY DISORDER (ADHD), PREDOMINANTLY INATTENTIVE TYPE: ICD-10-CM

## 2021-01-01 DIAGNOSIS — Z00.00 MEDICARE ANNUAL WELLNESS VISIT, SUBSEQUENT: Primary | ICD-10-CM

## 2021-01-01 DIAGNOSIS — G47.33 OSA ON CPAP: ICD-10-CM

## 2021-01-01 DIAGNOSIS — D64.9 ANEMIA, UNSPECIFIED TYPE: ICD-10-CM

## 2021-01-01 DIAGNOSIS — R68.89 ABNORMAL ANKLE BRACHIAL INDEX (ABI): ICD-10-CM

## 2021-01-01 DIAGNOSIS — Z23 NEED FOR VACCINATION: ICD-10-CM

## 2021-01-01 DIAGNOSIS — Z99.89 OSA ON CPAP: ICD-10-CM

## 2021-01-01 DIAGNOSIS — E66.09 CLASS 1 OBESITY DUE TO EXCESS CALORIES WITH SERIOUS COMORBIDITY AND BODY MASS INDEX (BMI) OF 33.0 TO 33.9 IN ADULT: ICD-10-CM

## 2021-01-01 DIAGNOSIS — Z51.81 THERAPEUTIC DRUG MONITORING: Primary | ICD-10-CM

## 2021-01-01 DIAGNOSIS — E78.5 DYSLIPIDEMIA: ICD-10-CM

## 2021-01-01 DIAGNOSIS — I70.0 ATHEROSCLEROSIS OF ABDOMINAL AORTA (HCC): ICD-10-CM

## 2021-01-01 DIAGNOSIS — E53.8 B12 DEFICIENCY: ICD-10-CM

## 2021-01-01 DIAGNOSIS — R82.90 URINE FINDING: Primary | ICD-10-CM

## 2021-01-01 DIAGNOSIS — M19.042 PRIMARY OSTEOARTHRITIS OF BOTH HANDS: ICD-10-CM

## 2021-01-01 DIAGNOSIS — J44.9 CHRONIC OBSTRUCTIVE PULMONARY DISEASE, UNSPECIFIED COPD TYPE (HCC): ICD-10-CM

## 2021-01-01 DIAGNOSIS — F39 MOOD DISORDER (HCC): ICD-10-CM

## 2021-01-01 DIAGNOSIS — G47.33 OSA (OBSTRUCTIVE SLEEP APNEA): ICD-10-CM

## 2021-01-01 PROCEDURE — 3074F SYST BP LT 130 MM HG: CPT | Performed by: FAMILY MEDICINE

## 2021-01-01 PROCEDURE — 3075F SYST BP GE 130 - 139MM HG: CPT | Performed by: FAMILY MEDICINE

## 2021-01-01 PROCEDURE — 75635 CT ANGIO ABDOMINAL ARTERIES: CPT | Performed by: FAMILY MEDICINE

## 2021-01-01 PROCEDURE — 3078F DIAST BP <80 MM HG: CPT | Performed by: FAMILY MEDICINE

## 2021-01-01 PROCEDURE — 99213 OFFICE O/P EST LOW 20 MIN: CPT | Performed by: UROLOGY

## 2021-01-01 PROCEDURE — 3008F BODY MASS INDEX DOCD: CPT | Performed by: FAMILY MEDICINE

## 2021-01-01 PROCEDURE — 99397 PER PM REEVAL EST PAT 65+ YR: CPT | Performed by: FAMILY MEDICINE

## 2021-01-01 PROCEDURE — 3075F SYST BP GE 130 - 139MM HG: CPT | Performed by: UROLOGY

## 2021-01-01 PROCEDURE — 99214 OFFICE O/P EST MOD 30 MIN: CPT | Performed by: FAMILY MEDICINE

## 2021-01-01 PROCEDURE — 3078F DIAST BP <80 MM HG: CPT | Performed by: UROLOGY

## 2021-01-01 PROCEDURE — G0438 PPPS, INITIAL VISIT: HCPCS | Performed by: FAMILY MEDICINE

## 2021-01-01 PROCEDURE — 82565 ASSAY OF CREATININE: CPT

## 2021-01-01 PROCEDURE — 81003 URINALYSIS AUTO W/O SCOPE: CPT | Performed by: UROLOGY

## 2021-01-01 PROCEDURE — 96160 PT-FOCUSED HLTH RISK ASSMT: CPT | Performed by: FAMILY MEDICINE

## 2021-01-01 RX ORDER — MELOXICAM 15 MG/1
15 TABLET ORAL DAILY PRN
Qty: 90 TABLET | Refills: 1 | Status: SHIPPED | OUTPATIENT
Start: 2021-01-01

## 2021-01-01 RX ORDER — MONTELUKAST SODIUM 10 MG/1
10 TABLET ORAL DAILY PRN
Qty: 90 TABLET | Refills: 0 | Status: SHIPPED | OUTPATIENT
Start: 2021-01-01 | End: 2021-01-01

## 2021-01-01 RX ORDER — DEXTROAMPHETAMINE SACCHARATE, AMPHETAMINE ASPARTATE MONOHYDRATE, DEXTROAMPHETAMINE SULFATE AND AMPHETAMINE SULFATE 6.25; 6.25; 6.25; 6.25 MG/1; MG/1; MG/1; MG/1
25 CAPSULE, EXTENDED RELEASE ORAL DAILY
Qty: 30 CAPSULE | Refills: 0 | Status: SHIPPED | OUTPATIENT
Start: 2021-01-01 | End: 2021-01-01

## 2021-01-01 RX ORDER — DEXTROAMPHETAMINE SACCHARATE, AMPHETAMINE ASPARTATE MONOHYDRATE, DEXTROAMPHETAMINE SULFATE AND AMPHETAMINE SULFATE 6.25; 6.25; 6.25; 6.25 MG/1; MG/1; MG/1; MG/1
25 CAPSULE, EXTENDED RELEASE ORAL DAILY
Qty: 30 CAPSULE | Refills: 0 | Status: SHIPPED | OUTPATIENT
Start: 2021-01-01 | End: 2021-01-01 | Stop reason: SDUPTHER

## 2021-01-01 RX ORDER — DEXTROAMPHETAMINE SACCHARATE, AMPHETAMINE ASPARTATE, DEXTROAMPHETAMINE SULFATE AND AMPHETAMINE SULFATE 5; 5; 5; 5 MG/1; MG/1; MG/1; MG/1
TABLET ORAL
Qty: 30 TABLET | Refills: 0 | Status: SHIPPED | OUTPATIENT
Start: 2021-01-01

## 2021-01-01 RX ORDER — ESCITALOPRAM OXALATE 10 MG/1
10 TABLET ORAL DAILY
Qty: 90 TABLET | Refills: 3 | Status: SHIPPED | OUTPATIENT
Start: 2021-01-01

## 2021-01-01 RX ORDER — FLUTICASONE PROPIONATE 50 MCG
2 SPRAY, SUSPENSION (ML) NASAL DAILY
Qty: 3 BOTTLE | Refills: 1 | Status: SHIPPED | OUTPATIENT
Start: 2021-01-01

## 2021-01-01 RX ORDER — DEXTROAMPHETAMINE SACCHARATE, AMPHETAMINE ASPARTATE, DEXTROAMPHETAMINE SULFATE AND AMPHETAMINE SULFATE 5; 5; 5; 5 MG/1; MG/1; MG/1; MG/1
TABLET ORAL
Qty: 30 TABLET | Refills: 0 | Status: SHIPPED | OUTPATIENT
Start: 2021-01-01 | End: 2021-01-01

## 2021-01-01 RX ORDER — ZOLPIDEM TARTRATE 5 MG/1
TABLET ORAL
Qty: 90 TABLET | Refills: 0 | Status: SHIPPED | OUTPATIENT
Start: 2021-01-01 | End: 2021-01-01

## 2021-01-01 RX ORDER — DEXTROAMPHETAMINE SACCHARATE, AMPHETAMINE ASPARTATE, DEXTROAMPHETAMINE SULFATE AND AMPHETAMINE SULFATE 5; 5; 5; 5 MG/1; MG/1; MG/1; MG/1
TABLET ORAL
Qty: 30 TABLET | Refills: 0 | Status: SHIPPED | OUTPATIENT
Start: 2021-01-01 | End: 2021-01-01 | Stop reason: SDUPTHER

## 2021-01-01 RX ORDER — MELOXICAM 15 MG/1
TABLET ORAL
Qty: 90 TABLET | Refills: 0 | Status: SHIPPED | OUTPATIENT
Start: 2021-01-01 | End: 2021-01-01

## 2021-01-01 RX ORDER — ZOLPIDEM TARTRATE 5 MG/1
5 TABLET ORAL NIGHTLY PRN
Qty: 90 TABLET | Refills: 1 | Status: SHIPPED | OUTPATIENT
Start: 2021-01-01

## 2021-01-01 RX ORDER — MONTELUKAST SODIUM 10 MG/1
10 TABLET ORAL DAILY PRN
Qty: 90 TABLET | Refills: 0 | Status: SHIPPED | OUTPATIENT
Start: 2021-01-01

## 2021-01-01 RX ORDER — DEXTROAMPHETAMINE SACCHARATE, AMPHETAMINE ASPARTATE MONOHYDRATE, DEXTROAMPHETAMINE SULFATE AND AMPHETAMINE SULFATE 6.25; 6.25; 6.25; 6.25 MG/1; MG/1; MG/1; MG/1
CAPSULE, EXTENDED RELEASE ORAL
Qty: 30 CAPSULE | Refills: 0 | Status: SHIPPED | OUTPATIENT
Start: 2021-01-01

## 2021-01-01 RX ORDER — CROMOLYN SODIUM 5.2 MG
1 AEROSOL, SPRAY WITH PUMP (ML) NASAL 3 TIMES DAILY
Qty: 3 BOTTLE | Refills: 1 | Status: SHIPPED | OUTPATIENT
Start: 2021-01-01

## 2021-01-06 NOTE — PROGRESS NOTES
HPI:   Beti Jovel is a 75year old male who presents for a MA (Medicare Advantage) [facility] (Once per calendar year).              Fall/Risk Assessment   He has been screened for Falls and is low risk: Fall/Risk Scorin    Cognitive Assessment history of prostate cancer     History of smoking 25-50 pack years     Attention deficit hyperactivity disorder (ADHD), predominantly inattentive type     Perennial allergic rhinitis     Osteoarthritis of both hands     GEMA (generalized anxiety disorder) seed therapy (2/14/2020), GEMA (generalized anxiety disorder) (2/14/2020), History of smoking 25-50 pack years (2/14/2020), Osteoarthritis of both hands (2/14/2020), Perennial allergic rhinitis (2/14/2020), Personal history of prostate cancer (2/14/2020), a Eyes Visual Acuity: Corrected Both Eyes Chart Acuity: 20/25   Able To Tolerate Visual Acuity: Yes      General Appearance:  Alert, cooperative, no distress, appears stated age   Head:  Normocephalic, without obvious abnormality, atraumatic   Eyes:  PERRL, type  Attention deficit hyperactivity disorder (adhd), predominantly inattentive type  William (generalized anxiety disorder)  Primary osteoarthritis involving multiple joints  Primary osteoarthritis of both hands  Personal history of prostate cancer  Erectile medication on days when he does not feel he needs it or to try only taking a half a dose. - amphetamine-dextroamphetamine (ADDERALL) 30 MG Oral Tab; Take 1 tablet (30 mg total) by mouth daily. Dispense: 30 tablet;  Refill: 0  - amphetamine-dextroampheta [927][Q]      Prevnar (Pneumococcal 13) (Same dose all ages)      Meds & Refills for this Visit:  Requested Prescriptions     Signed Prescriptions Disp Refills   • escitalopram 10 MG Oral Tab 90 tablet 3     Sig: Take 1 tablet (10 mg total) by mouth daily. SERVICES  INDICATIONS AND SCHEDULE Internal Lab or Procedure External Lab or Procedure   Diabetes Screening      HbgA1C   Annually No results found for: A1C    No flowsheet data found.     Fasting Blood Sugar (FSB)Annually No results found for: GLUCOSE, GLU Medically needed    Zoster   Not covered by Medicare Part B No vaccine history found This may be covered with your pharmacy  prescription benefits Clear

## 2021-01-21 NOTE — PROGRESS NOTES
Alli Menendez is a 77year old male. HPI:       ADHD:  Primarily inattentive type. Patient still finds the Adderall beneficial for concentration and staying on task for work. Taking extended release every day when he works.   Taking full tab of the Hr Take 1 capsule (25 mg total) by mouth daily. 30 capsule 0   • Cetirizine HCl 5 MG Oral Tab Take 5 mg by mouth daily. • MONTELUKAST SODIUM 10 MG Oral Tab Take 1 tablet (10 mg total) by mouth daily as needed.   90 tablet 1   • ergocalciferol 1.25 MG (5 abdominal pain/nausea/vomiting/blood in stool/black stool/bloating/constipation/diarrhea  NEURO: denies headaches/dizziness/fainting/weakness/change in vision  Psych: No complaints of depression or anxiety      Immunization History  Administered accordance with AASM recommendations, hypopnea events are scored based on an oxygen saturation more than or equal to 4 percent. Body position is documented via technician notes every 15 minutes.  There is an additional channel for measurement of CPAP flow f effect on degree of sleep-disordered breathing identified.   5.       If daytime sleepiness is an issue, the patient needs to understand the potential dangers associated with reduced daytime vigilance.     Thank you for your confidence in the [address] Visit:  Requested Prescriptions     Signed Prescriptions Disp Refills   • amphetamine-dextroamphetamine (ADDERALL) 20 MG Oral Tab 30 tablet 0     Si/2-1 tab p.o. daily as needed.    • Amphetamine-Dextroamphet ER (ADDERALL XR) 25 MG Oral Capsule SR 24 Hr

## 2021-02-23 NOTE — TELEPHONE ENCOUNTER
ZOLPIDEM TARTRATE 11/30/2020 11/30/2020 5 90 tablet     MELOXICAM 15 MG TABLET 12/02/2020 12/02/2020  90 tablet  90     MONTELUKAST SOD 10 MG TABLET 11/27/2020 09/04/2020  90 tablet  90      LOV 1/21/21.    Return in about 8 weeks (around 3/15/2021) for Sup

## 2021-03-26 PROBLEM — Z51.81 THERAPEUTIC DRUG MONITORING: Status: RESOLVED | Noted: 2020-01-01 | Resolved: 2021-01-01

## 2021-03-26 PROBLEM — J44.9 CHRONIC OBSTRUCTIVE PULMONARY DISEASE, UNSPECIFIED COPD TYPE (HCC): Status: ACTIVE | Noted: 2021-01-01

## 2021-03-26 PROBLEM — G47.33 OSA ON CPAP: Status: ACTIVE | Noted: 2021-01-01

## 2021-03-26 PROBLEM — E66.01 MORBID OBESITY WITH BMI OF 45.0-49.9, ADULT (HCC): Status: RESOLVED | Noted: 2020-03-13 | Resolved: 2021-01-01

## 2021-03-26 PROBLEM — S39.012A STRAIN OF LUMBAR REGION: Status: RESOLVED | Noted: 2020-06-11 | Resolved: 2021-01-01

## 2021-03-26 PROBLEM — E66.09 CLASS 1 OBESITY DUE TO EXCESS CALORIES WITH SERIOUS COMORBIDITY AND BODY MASS INDEX (BMI) OF 33.0 TO 33.9 IN ADULT: Status: ACTIVE | Noted: 2021-01-01

## 2021-03-26 PROBLEM — R06.83 SNORING: Status: RESOLVED | Noted: 2020-01-01 | Resolved: 2021-01-01

## 2021-03-26 PROBLEM — R91.8 PULMONARY NODULES: Status: ACTIVE | Noted: 2021-01-01

## 2021-03-26 PROBLEM — R91.8 ABNORMAL CT LUNG SCREENING: Status: ACTIVE | Noted: 2021-01-01

## 2021-03-26 PROBLEM — Z99.89 OSA ON CPAP: Status: ACTIVE | Noted: 2021-01-01

## 2021-03-26 NOTE — PATIENT INSTRUCTIONS
Flex Garzon's SCREENING SCHEDULE   Tests on this list are recommended by your physician but may not be covered, or covered at this frequency, by your insurer. Please check with your insurance carrier before scheduling to verify coverage.     JUAN CARLOS often if abnormal Colonoscopy due on 07/05/2027 Update [facility] if applicable    Flex Sigmoidoscopy Screen  Covered every 5 years No results found for this or any previous visit. No flowsheet data found.      Fecal Occult Blood   Covered Annually your prescription benefits, but Medicare does not cover unless Medically needed    Zoster (Not covered by Medicare Part B) No orders found for this or any previous visit.  This may be covered with your pharmacy  prescription benefits     Recommended Website

## 2021-03-26 NOTE — PROGRESS NOTES
HPI:   Alice Arredondo is a 77year old male who presents for a MA (Medicare Advantage) [facility] (Once per calendar year).            Fall/Risk Assessment   He has been screened for Falls and is low risk: Fall/Risk Scorin    Cognitive Assessment   H pack years     Attention deficit hyperactivity disorder (ADHD), predominantly inattentive type     Perennial allergic rhinitis     Osteoarthritis of both hands     GEMA (generalized anxiety disorder)     Hypnotic dependence with current use ([address]     Athero needed. zolpidem 5 MG Oral Tab, Take 1 tablet (5 mg total) by mouth nightly as needed for Sleep. Meloxicam 15 MG Oral Tab, Take 1 tablet (15 mg total) by mouth daily as needed for Pain.   Amphetamine-Dextroamphet ER (ADDERALL XR) 25 MG Oral Capsule SR 24 years of use. He has never used smokeless tobacco. He reports current alcohol use of about 2.0 standard drinks of alcohol per week. He reports that he does not use drugs.      REVIEW OF SYSTEMS:   GENERAL: feels well otherwise  SKIN: denies any unusual skin Skin:  Skin is warm and dry. No visible rashes. Lymph nodes:  No cervical or supraclavicular adenopathy   Neurologic:  Alert and oriented x3.             Vaccination History     Immunization History   Administered Date(s) Administered   • DTAP INFANRIX or will shortly . Follow-up after completing testing including labs. - CTA ABD PEL SIERRA LEG RUNOFF DIAPH TO TOES IV CONTRAST(CPT=75635);  Future    - COMP METABOLIC PANEL (14)  - LIPID PANEL  - ASSAY, THYROID STIM HORMONE  - FREE T4 (FREE THYROXINE patient. Okay to also continue Nasalcrom. Continue montelukast.    - Cromolyn Sodium (NASALCROM) 5.2 MG/ACT Nasal Aero Soln; 1 spray by Nasal route 3 (three) times daily. Dispense: 3 Bottle; Refill: 1  - Montelukast Sodium 10 MG Oral Tab;  Take 1 tablet urologist.    24. B12 deficiency  Recheck level. - VITAMIN B12    25. Vitamin D deficiency  Recheck level.   - VITAMIN D, 25-HYDROXY        Orders Placed This Encounter      CBC [6399] [Q]      COMP METABOLIC PANEL [24380] [Q]      LIPID PANEL [4820] [Q] as needed       Imaging & Consults:  CT CHEST (CPT=71250)  CTA ABD PEL SIERRA LEG RUNOFF DIAPH TO TOES IV CONTRAST(CPT=75635)      PLAN SUMMARY:       And as above.     Diet assessment: fair     PLAN:  The patient indicates understanding of these issues and ag applicable     Immunizations (Update Immunization Activity if applicable)     Influenza  Covered Annually 9/23/2020   Please get every year    Pneumococcal 13 (Prevnar)  Covered Once after 65 03/13/2020 Please get once after your 65th birthday    Valeria Morales

## 2021-03-27 PROBLEM — F39 MOOD DISORDER (HCC): Status: ACTIVE | Noted: 2021-01-01

## 2021-06-08 NOTE — TELEPHONE ENCOUNTER
RN called this patient to remind him of his appt and for the lab draw needed prior to visit. Need to complete PSA prior. Left message.

## 2021-06-24 NOTE — TELEPHONE ENCOUNTER
RN called patient to remind him of his appt tomorrow, 6/25 with PSA prior. Patient stated that he will complete it today. All questions answered. He agreed to plans.

## 2021-06-25 NOTE — PROGRESS NOTES
Rooming Clinician:     lAice Arredondo is a 77year old male. Patient presents with: Follow - Up: to discuss PSA (<0.1 on 6/24) result        HPI:     Patient returns for follow-up. His history of prostate cancer treated with brachytherapy remotely.   He Amphetamine-Dextroamphet ER (ADDERALL XR) 25 MG Oral Capsule SR 24 Hr Take 1 capsule (25 mg total) by mouth daily. 30 capsule 0   • Amphetamine-Dextroamphet ER (ADDERALL XR) 25 MG Oral Capsule SR 24 Hr Take 1 capsule (25 mg total) by mouth daily.  30 capsul hernia  : The penis is circumcised  Urethral meatus is patent without discharge. The testicles are descended bilaterally and are normal shape and size.   The prostate exam is firm and small status post radiation   NEURO: grossly normal  EXTREMITIES: no c

## 2021-07-15 NOTE — PROGRESS NOTES
Patient notified that he needs to complete Hemoglobinopathy profile. Patient verbalized understanding. States will complete next week.

## 2021-08-18 NOTE — TELEPHONE ENCOUNTER
Last fill 5/27/21  Last OV  19. Attention deficit hyperactivity disorder (ADHD), predominantly inattentive type  20. High risk medication use  Stable. Doing well, patient takes extended release on days that he works.   Patient takes 1/2-1 tab daily as need

## 2021-08-26 NOTE — TELEPHONE ENCOUNTER
Requested Prescriptions     Pending Prescriptions Disp Refills   • montelukast 10 MG Oral Tab 90 tablet 0     Sig: Take 1 tablet (10 mg total) by mouth daily as needed.      Last fill was 3/26/21 90 days  Last OV 3/26/21  Future ov 9/24/21

## 2021-09-23 ENCOUNTER — TELEPHONE (OUTPATIENT)
Dept: FAMILY MEDICINE CLINIC | Facility: CLINIC | Age: 66
End: 2021-09-23

## 2021-09-23 NOTE — TELEPHONE ENCOUNTER
Pt's sister called and said she was going thru her brother's things and she noticed he had an appt tomorrow. She said her brother passed away on 9/13/21.

## (undated) NOTE — LETTER
07/24/20        Jonathan Swift  [address] [id number]      Dear Oralia Us records indicate that you have outstanding lab work and or testing that was ordered for you and has not yet been completed:  Tests ordered through [facility]

## (undated) NOTE — LETTER
6/25/2020              [address] [place] [id number]         Dear Salima Monday,    It was a pleasure to see you.   Your recent PSA is undetectable and testosterone level is normal.  Okay to use Cialis as prescribed and foll

## (undated) NOTE — LETTER
10/23/20        Violetta Kelly  [facility] [address] [id number]      Dear [place] records indicate that you have outstanding lab work and or testing that was ordered for you and has not yet been completed:  Orders Placed This Encounter